# Patient Record
Sex: FEMALE | Race: WHITE | Employment: FULL TIME | URBAN - METROPOLITAN AREA
[De-identification: names, ages, dates, MRNs, and addresses within clinical notes are randomized per-mention and may not be internally consistent; named-entity substitution may affect disease eponyms.]

---

## 2017-12-11 LAB
CHLAMYDIA, EXTERNAL: NEGATIVE
N. GONORRHEA, EXTERNAL: NEGATIVE

## 2018-03-19 LAB
GTT 120 MIN, EXTERNAL: 147
GTT 180 MIN, EXTERNAL: 103
GTT 60 MIN, EXTERNAL: 194
GTT, 1 HR, GLUCOLA, EXTERNAL: 167
GTT, FASTING, EXTERNAL: 78
HBSAG, EXTERNAL: NORMAL
HIV, EXTERNAL: NONREACTIVE
RUBELLA, EXTERNAL: NORMAL
TYPE, ABO & RH, EXTERNAL: NORMAL

## 2018-05-16 LAB
ANTIBODY SCREEN, EXTERNAL: NORMAL
T. PALLIDUM, EXTERNAL: NORMAL

## 2018-07-10 LAB — GRBS, EXTERNAL: NORMAL

## 2018-07-30 ENCOUNTER — HOSPITAL ENCOUNTER (INPATIENT)
Age: 33
LOS: 4 days | Discharge: HOME | End: 2018-08-03
Attending: OBSTETRICS & GYNECOLOGY | Admitting: OBSTETRICS & GYNECOLOGY
Payer: COMMERCIAL

## 2018-07-30 DIAGNOSIS — Z34.90 PREGNANCY, UNSPECIFIED GESTATIONAL AGE: Primary | ICD-10-CM

## 2018-07-30 LAB
A1 MICROGLOB PLACENTAL VAG QL: POSITIVE
BASOPHILS # BLD: 0 K/UL (ref 0–0.1)
BASOPHILS NFR BLD: 0 % (ref 0–1)
CONTROL LINE PRESENT?: NORMAL
DAILY QC (YES/NO)?: YES
DIFFERENTIAL METHOD BLD: ABNORMAL
EOSINOPHIL # BLD: 0 K/UL (ref 0–0.4)
EOSINOPHIL NFR BLD: 0 % (ref 0–7)
ERYTHROCYTE [DISTWIDTH] IN BLOOD BY AUTOMATED COUNT: 14.1 % (ref 11.5–14.5)
EXPIRATION DATE: NORMAL
HCT VFR BLD AUTO: 32.4 % (ref 35–47)
HGB BLD-MCNC: 10.2 G/DL (ref 11.5–16)
IMM GRANULOCYTES # BLD: 0.1 K/UL (ref 0–0.04)
IMM GRANULOCYTES NFR BLD AUTO: 1 % (ref 0–0.5)
INTERNAL NEGATIVE CONTROL: NORMAL
KIT LOT NO.: NORMAL
LYMPHOCYTES # BLD: 2.2 K/UL (ref 0.8–3.5)
LYMPHOCYTES NFR BLD: 21 % (ref 12–49)
MCH RBC QN AUTO: 24.4 PG (ref 26–34)
MCHC RBC AUTO-ENTMCNC: 31.5 G/DL (ref 30–36.5)
MCV RBC AUTO: 77.5 FL (ref 80–99)
MONOCYTES # BLD: 0.7 K/UL (ref 0–1)
MONOCYTES NFR BLD: 7 % (ref 5–13)
NEUTS SEG # BLD: 7.5 K/UL (ref 1.8–8)
NEUTS SEG NFR BLD: 71 % (ref 32–75)
NRBC # BLD: 0 K/UL (ref 0–0.01)
NRBC BLD-RTO: 0 PER 100 WBC
PH, VAGINAL FLUID: 7 (ref 5–6.1)
PLATELET # BLD AUTO: 282 K/UL (ref 150–400)
PMV BLD AUTO: 10.9 FL (ref 8.9–12.9)
RBC # BLD AUTO: 4.18 M/UL (ref 3.8–5.2)
WBC # BLD AUTO: 10.5 K/UL (ref 3.6–11)

## 2018-07-30 PROCEDURE — 83986 ASSAY PH BODY FLUID NOS: CPT | Performed by: OBSTETRICS & GYNECOLOGY

## 2018-07-30 PROCEDURE — 85025 COMPLETE CBC W/AUTO DIFF WBC: CPT

## 2018-07-30 PROCEDURE — 75410000002 HC LABOR FEE PER 1 HR

## 2018-07-30 PROCEDURE — 84112 EVAL AMNIOTIC FLUID PROTEIN: CPT | Performed by: OBSTETRICS & GYNECOLOGY

## 2018-07-30 PROCEDURE — 65410000002 HC RM PRIVATE OB

## 2018-07-30 PROCEDURE — 36415 COLL VENOUS BLD VENIPUNCTURE: CPT

## 2018-07-30 RX ORDER — SODIUM CHLORIDE, SODIUM LACTATE, POTASSIUM CHLORIDE, CALCIUM CHLORIDE 600; 310; 30; 20 MG/100ML; MG/100ML; MG/100ML; MG/100ML
125 INJECTION, SOLUTION INTRAVENOUS CONTINUOUS
Status: DISCONTINUED | OUTPATIENT
Start: 2018-07-30 | End: 2018-08-03 | Stop reason: HOSPADM

## 2018-07-30 RX ORDER — OXYTOCIN IN 5 % DEXTROSE 30/500 ML
.5-2 PLASTIC BAG, INJECTION (ML) INTRAVENOUS
Status: DISCONTINUED | OUTPATIENT
Start: 2018-07-30 | End: 2018-08-03 | Stop reason: HOSPADM

## 2018-07-30 RX ORDER — DOCUSATE SODIUM 100 MG/1
100 CAPSULE, LIQUID FILLED ORAL
COMMUNITY

## 2018-07-30 RX ORDER — NALOXONE HYDROCHLORIDE 0.4 MG/ML
0.4 INJECTION, SOLUTION INTRAMUSCULAR; INTRAVENOUS; SUBCUTANEOUS AS NEEDED
Status: DISCONTINUED | OUTPATIENT
Start: 2018-07-30 | End: 2018-07-31 | Stop reason: HOSPADM

## 2018-07-30 NOTE — PROGRESS NOTES
1900 - pt reports to triage with c/o possible ROM around 1700 - with continued leaking of fluid since. Clear, no odor. Reports positive FM, denies VB. Consents reviewed and signed by Monserrat Weiner. 1930 - amnisure positive. Dr. Ritu Cristobal notified. Pt moved to labor room and admission process begun. 0 - Primary Nurse Colletta Montenegro, RN and Senia Yen RN performed a dual skin assessment on this patient No impairment noted Teodoro score is 22 
 
2019 - bedside ultrasound to confirm vertex. Dr. Ritu Cristobal reviewed POC with patient. 2028 - EFM and toco removed for ambulation. 4683 - Dr Ritu Cristobal asked about medication to help sleep - ordered ambien. 0405 - pt called out because IV pump beeping occlusion. Flushed with some resistance. Will retape. Pt up to BR. Feeling pain in her back 
 
0700 - verbal bedside report to [employer]. Turned over care of pt at this time.

## 2018-07-30 NOTE — IP AVS SNAPSHOT
[address] [facility] 
[phone] Patient: Molina Martinez MRN: [id number] :1985 About your hospitalization You were admitted on:  2018 You last received care in the:  [facility] You were discharged on:  August 3, 2018 Why you were hospitalized Your primary diagnosis was:  Not on File Your diagnoses also included:  Pregnancy Follow-up Information Follow up With Details Comments Contact Info Mandeep Ross MD   [address] [address] 
[phone] Lamonte Carrasco MD In 6 weeks  [address] [facility] 
[phone] Lamonte Carrasco MD In 6 weeks Follow up with [facility] in 6 weeks; call to schedule appointment. [address] [facility] 
[phone] Discharge Orders None A check minerva indicates which time of day the medication should be taken. My Medications START taking these medications Instructions Each Dose to Equal  
 Morning Noon Evening Bedtime  
 ibuprofen 800 mg tablet Commonly known as:  MOTRIN Your last dose was: Your next dose is: Take 1 Tab by mouth every eight (8) hours as needed for Pain. 800 mg  
    
   
   
   
  
 oxyCODONE-acetaminophen 5-325 mg per tablet Commonly known as:  PERCOCET Your last dose was: Your next dose is: Take 1-2 Tabs by mouth every six (6) hours as needed for Pain. Max Daily Amount: 8 Tabs. 1-2 Tab CONTINUE taking these medications Instructions Each Dose to Equal  
 Morning Noon Evening Bedtime COLACE 100 mg capsule Generic drug:  docusate sodium Your last dose was: Your next dose is: Take 100 mg by mouth two (2) times daily as needed for Constipation.   
 100 mg  
    
   
   
   
  
 PNV38-Iron Cbn&Gluc-FA-DSS-DHA 35-1- mg Cmpk Your last dose was: Your next dose is: Take 1 Tab by mouth daily. Indications: pregnancy 1 Tab Where to Get Your Medications Information on where to get these meds will be given to you by the nurse or doctor. ! Ask your nurse or doctor about these medications  
  ibuprofen 800 mg tablet  
 oxyCODONE-acetaminophen 5-325 mg per tablet Opioid Education Prescription Opioids: What You Need to Know: 
 
Prescription opioids can be used to help relieve moderate-to-severe pain and are often prescribed following a surgery or injury, or for certain health conditions. These medications can be an important part of treatment but also come with serious risks. Opioids are strong pain medicines. Examples include hydrocodone, oxycodone, fentanyl, and morphine. Heroin is an example of an illegal opioid. It is important to work with your health care provider to make sure you are getting the safest, most effective care. WHAT ARE THE RISKS AND SIDE EFFECTS OF OPIOID USE? Prescription opioids carry serious risks of addiction and overdose, especially with prolonged use. An opioid overdose, often marked by slow breathing, can cause sudden death. The use of prescription opioids can have a number of side effects as well, even when taken as directed. · Tolerance-meaning you might need to take more of a medication for the same pain relief · Physical dependence-meaning you have symptoms of withdrawal when the medication is stopped. Withdrawal symptoms can include nausea, sweating, chills, diarrhea, stomach cramps, and muscle aches. Withdrawal can last up to several weeks, depending on which drug you took and how long you took it. · Increased sensitivity to pain · Constipation · Nausea, vomiting, and dry mouth · Sleepiness and dizziness · Confusion · Depression · Low levels of testosterone that can result in lower sex drive, energy, and strength · Itching and sweating RISKS ARE GREATER WITH:      
· History of drug misuse, substance use disorder, or overdose · Mental health conditions (such as depression or anxiety) · Sleep apnea · Older age (72 years or older) · Pregnancy Avoid alcohol while taking prescription opioids. Also, unless specifically advised by your health care provider, medications to avoid include: · Benzodiazepines (such as Xanax or Valium) · Muscle relaxants (such as Soma or Flexeril) · Hypnotics (such as Ambien or Lunesta) · Other prescription opioids KNOW YOUR OPTIONS Talk to your health care provider about ways to manage your pain that don't involve prescription opioids. Some of these options may actually work better and have fewer risks and side effects. Options may include: 
· Pain relievers such as acetaminophen, ibuprofen, and naproxen · Some medications that are also used for depression or seizures · Physical therapy and exercise · Counseling to help patients learn how to cope better with triggers of pain and stress. · Application of heat or cold compress · Massage therapy · Relaxation techniques Be Informed Make sure you know the name of your medication, how much and how often to take it, and its potential risks & side effects. IF YOU ARE PRESCRIBED OPIOIDS FOR PAIN: 
· Never take opioids in greater amounts or more often than prescribed. Remember the goal is not to be pain-free but to manage your pain at a tolerable level. · Follow up with your primary care provider to: · Work together to create a plan on how to manage your pain. · Talk about ways to help manage your pain that don't involve prescription opioids. · Talk about any and all concerns and side effects. · Help prevent misuse and abuse. · Never sell or share prescription opioids · Help prevent misuse and abuse. · Store prescription opioids in a secure place and out of reach of others (this may include visitors, children, friends, and family). · Safely dispose of unused/unwanted prescription opioids: Find your community drug take-back program or your pharmacy mail-back program, or flush them down the toilet, following guidance from the Food and Drug Administration (www.fda.gov/Drugs/ResourcesForYou). · Visit www.cdc.gov/drugoverdose to learn about the risks of opioid abuse and overdose. · If you believe you may be struggling with addiction, tell your health care provider and ask for guidance or call [employer] at 1-637-400-FDGC. Discharge Instructions  Delivery (Postpartum Care): After Your Visit Your Care Instructions Your baby was delivered through a cut, called an incision, in your belly. This surgery is called a  delivery, or a . After childbirth (postpartum period), your body goes through many changes. In the next few weeks, your body will slowly heal. It can take 4 weeks or more for the incision from your surgery to heal. It is easy to get too tired and overwhelmed during the first weeks after your baby is born. You may feel emotional during this time. Changes in your hormones can shift your mood without warning. It is easy to get too tired and overwhelmed during the first weeks after childbirth. Take it easy on yourself. You may find it hard to meet the extra demands on your energy and time. Get rest whenever you can, accept help from others, and eat well and drink plenty of fluids. After a , you may have gas or need to burp a lot. You may have some light vaginal bleeding or spotting for up to 6 weeks after surgery. It is normal to have pain in the incision area off and on during the next 6 months. No driving for 1 week after delivery. No heavy lifting. No more than 8 lbs or the size of baby for 2-3 weeks. Limit use of stairs. 1-2 times per day for the first week after delivery. Follow-up care is a key part of your treatment and safety. Be sure to make and go to all appointments, and call your doctor if you are having problems. Its also a good idea to know your test results and keep a list of the medicines you take. Around 4 to 6 weeks after your baby's birth, you will have a follow-up visit with your doctor. This visit is your time to talk to your doctor about anything you are concerned or curious about. Keep a list of questions to bring to your postpartum visit. Your questions might be about: 
Changes in your breasts, such as lumps or soreness. When to expect your menstrual period to start again. What form of birth control is best for you. Weight you have put on during the pregnancy. Exercise options. What foods and drinks are best for you, especially if you are breast-feeding. Problems you might be having with breast-feeding. When you can have sex. Some women may want to talk about lubricants for the vagina. Any feelings of sadness or restlessness that you are having. How can you care for yourself at home? Be sure that you understand any instructions your doctor has given you after surgery. This will guide your activities and tell you what to watch for in the next few weeks. Vaginal bleeding and cramps After delivery, you will have a bloody discharge from the vagina. This will turn pink within a week and then white or yellow after about 10 days. It may last for 2 to 4 weeks or longer, until the uterus has healed. You may have cramps for the first few days after childbirth. These are normal and occur as the uterus shrinks to normal size. Take an over-the-counter pain medicine, such as acetaminophen (Tylenol), ibuprofen (Advil, Motrin), or naproxen (Aleve), for cramps.  Read and follow all instructions on the label. Do not take aspirin, because it can cause more bleeding. Take other medicines exactly as prescribed. Call your doctor if you have any problems with your medicine. Incision You may have had staples removed while you were in the hospital. Keep the area clean, and wash it with water and mild soap. If you had stitches, they should dissolve on their own and should not need to be removed. Follow your doctor's instructions for cleaning the stitched area. If you have steri-strips (tape) the will come off on their own in 7-10 days. Breast-feeding and breast fullness Breast-feed your baby in positions that do not put pressure on your incision, such as side-lying or football-hold positions. Ask your nurse, doctor, midwife, or lactation specialist to show you these positions if you do not know what they are. Your breasts may overfill (engorge) in the first few days after delivery. To help milk flow and to relieve pain, warm your breasts in the shower or by using warm, moist towels before nursing. Express a small amount of milk to soften your breast near the nipple and then feed your baby. Put an ice or cold pack on your breast for a few minutes after nursing to reduce swelling and pain. Put a thin cloth between the ice pack and your skin. If you are not nursing, do not put warmth on your breasts or touch your breasts. Wear a tight bra or sports bra, and use an ice or cold pack on your breasts to reduce swelling and pain. Breast fullness usually lasts 3 to 4 days. Activity Eat a balanced diet. Do not try to lose weight by cutting calories. Keep taking your prenatal vitamins, or take a multivitamin. Get as much rest as you can. Try to take naps when your baby sleeps during the day. Get help with household chores from family or friends, if you can. Do not try to do it all yourself. Get some gentle exercise, such as walking, every day.  Do not lift more than 10 pounds for the next 4 to 6 weeks. Do not have sex or use tampons until you have stopped bleeding and at least 4 to 6 weeks have passed since you gave birth. Talk to your doctor about birth control. You can get pregnant even before your period returns. Also, you can get pregnant while you are breast-feeding. Mental health It is normal to have some sadness, anxiety, sleeplessness, and mood swings after you go home. If you feel upset or hopeless for more than a few days, talk to your doctor. If you have any thoughts of hurting yourself or anyone elseincluding your babycall 911 or go to the emergency room. Many women get the \"baby blues\" during the first few days after childbirth. The \"baby blues\" usually peak around the fourth day and then ease up in less than 2 weeks. If you have the \"baby blues\" for more than a few days, or if you have thoughts of hurting yourself or your baby, call your doctor right away. Constipation and hemorrhoids Drink plenty of fluids (8 to 10 glasses a day), especially water. · Eat plenty of fiber each day to avoid constipation. Include foods such as whole-grain breads and cereals, raw vegetables, raw and dried fruits, and beans. · If you have hemorrhoids or swelling or pain around the opening of your vagina, try using cold and heat. You can put ice or a cold pack on the area for 10 to 20 minutes at a time. Put a thin cloth between the ice and your skin. Also try sitting in a few inches of warm water (sitz bath) 3 times a day and after bowel movements. Drink plenty of fluids, enough so that your urine is light yellow or clear like water. If you have kidney, heart, or liver disease and have to limit fluids, talk with your doctor before you increase the amount of fluids you drink. When should you call for help? Call 911 anytime you think you may need emergency care. For example, call if: 
You are thinking of hurting yourself, your baby, or anyone else. You have sudden, severe pain in your belly. You pass out (lose consciousness). Call your doctor now or seek immediate medical care if: 
You have severe vaginal bleeding. You are passing blood clots and soaking through a pad each hour for 2 or more hours. Your vaginal bleeding seems to be getting heavier or is still bright red 4 days after delivery, or you pass blood clots larger than the size of a golf ball. You have increased redness, heat, or drainage in the incision area. You are dizzy or lightheaded, or you feel like you may faint. You are vomiting or cannot keep fluids down. You have a fever. You have new or more belly pain. You pass tissue (not just blood). The incision seems to be pulling open or starts bleeding. Your vaginal discharge smells bad. Your belly feels more tender or full and hard. You have pain or redness in one or both breasts. You feel sad, anxious, or hopeless for more than a few days. Where can you learn more? Go to English TV.be Enter W957 in the search box to learn more about \" Delivery: After Your Visit\". or 
  
Go to English [employer].be Enter J731  in the search box to learn more about \"Postpartum Care: After Your Visit\". This care instruction is for use with your licensed healthcare professional. If you have questions about a medical condition or this instruction, always ask your healthcare professional. Care instructions adapted by [employer] (which disclaims liability or warranty for this information) from Torie Montoya, [address] [facility] 2008. [facility] disclaims any warranty or liability for your use of this information. Follow up with [facility] in 6 weeks; call to schedule appointment. [employer] Announcement We are excited to announce that we are making your provider's discharge notes available to you in [employer].   You will see these notes when they are completed and signed by the physician that discharged you from your recent hospital stay. If you have any questions or concerns about any information you see in Yi De, please call the Health Information Department where you were seen or reach out to your Primary Care Provider for more information about your plan of care. Introducing [facility]! Naresh Shelton introduces Yi De patient portal. Now you can access parts of your medical record, email your doctor's office, and request medication refills online. 1. In your internet browser, go to [email or web address] [email or web address] [email or web address] Team Connect 2. Click on the First Time User? Click Here link in the Sign In box. You will see the New Member Sign Up page. 3. Enter your Yi De Access Code exactly as it appears below. You will not need to use this code after youve completed the sign-up process. If you do not sign up before the expiration date, you must request a new code. · Yi De Access Code: [email or web address] Expires: 11/1/2018 12:49 PM 
 
4. Enter the last four digits of your Social Security Number (xxxx) and Date of Birth (mm/dd/yyyy) as indicated and click Submit. You will be taken to the next sign-up page. 5. Create a Yi De ID. This will be your Yi De login ID and cannot be changed, so think of one that is secure and easy to remember. 6. Create a Yi De password. You can change your password at any time. 7. Enter your Password Reset Question and Answer. This can be used at a later time if you forget your password. 8. Enter your e-mail address. You will receive e-mail notification when new information is available in [address] 9. Click Sign Up. You can now view and download portions of your medical record. 10. Click the Download Summary menu link to download a portable copy of your medical information.  
 
If you have questions, please visit the Frequently Asked Questions section of the [employer]. Remember, MyChart is NOT to be used for urgent needs. For medical emergencies, dial 911. Now available from your iPhone and Android! Introducing Cristi Turpin As a Sarina Bergman patient, I wanted to make you aware of our electronic visit tool called Cristi Leosfin. [employer] 24/7 allows you to connect within minutes with a medical provider 24 hours a day, seven days a week via a mobile device or tablet or logging into a secure website from your computer. You can access Cristi Turpin from anywhere in the [place]. A virtual visit might be right for you when you have a simple condition and feel like you just dont want to get out of bed, or cant get away from work for an appointment, when your regular [facility] provider is not available (evenings, weekends or holidays), or when youre out of town and need minor care. Electronic visits cost only $49 and if the [facility] 24/7 provider determines a prescription is needed to treat your condition, one can be electronically transmitted to a nearby pharmacy*. Please take a moment to enroll today if you have not already done so. The enrollment process is free and takes just a few minutes. To enroll, please download the [employer] 24/7 eben to your tablet or phone, or visit [email or web address] to enroll on your computer. And, as an [address] [place] [address] patient with a [employer] account, the results of your visits will be scanned into your electronic medical record and your primary care provider will be able to view the scanned results. We urge you to continue to see your regular [facility] provider for your ongoing medical care. And while your primary care provider may not be the one available when you seek a Cristi Turpin virtual visit, the peace of mind you get from getting a real diagnosis real time can be priceless. For more information on Cristi Toonely, view our Frequently Asked Questions (FAQs) at [email or web address]. Sincerely, 
 
Madhuri Dennison MD 
Chief Medical Officer [employer] *:  certain medications cannot be prescribed via Cristi Turpin Providers Seen During Your Hospitalization Provider Specialty Primary office phone Geronimo Rodriguez MD Obstetrics & Gynecology [phone] Mariposa Wilkinson MD Obstetrics & Gynecology [phone] Your Primary Care Physician (PCP) Primary Care Physician Office Phone Office Fax Marshell Counts [phone] You are allergic to the following No active allergies Recent Documentation Height Weight Breastfeeding? BMI OB Status Smoking Status 1.575 m 66.7 kg Yes 26.89 kg/m2 Recent pregnancy Never Smoker Emergency Contacts Name Discharge Info Relation Home Work Mobile Demetrius Oliveros DISCHARGE CAREGIVER [3] Spouse [3]   [phone] Ceci Lawler  Mother [14] [phone] Patient Belongings The following personal items are in your possession at time of discharge: 
  Dental Appliances: None  Visual Aid: None      Home Medications: None   Jewelry: None  Clothing: Undergarments, Shirt, Footwear, Pants    Other Valuables: Personal toiletries, Cell Phone, Darylene Ponrylanf  Personal Items Sent to Safe: none Please provide this summary of care documentation to your next provider. Signatures-by signing, you are acknowledging that this After Visit Summary has been reviewed with you and you have received a copy. Patient Signature:  ____________________________________________________________ Date:  ____________________________________________________________  
  
[facility] Signature[facility]  ____________________________________________________________ Date:  ____________________________________________________________

## 2018-07-30 NOTE — H&P
EDC:2018 EGA: 39 weeks, 3 days HPI: Pt is a 29yo  @ 39w3d who presents with PROM at 1700. Patient denies any issues with this pregnancy. She denies any vaginal bleeding and denies any contractions. Patient's Prenatal Care with Doctor of Record Estella Nuñez MD Notable For - Abnormal glucola 3 hr GTT normal 
Marginal placenta previa w/o hemorrhage, resolved at 28 weeks. Normal pregnancy primigravida  lab screening Impression & Recommendations: 
 
Problem # 1:  PROM 
-admit to L&D 
-CBC 
-ambulate as tolerated 
-pain meds/epidural as desired 
-if no labor in 4-6 hours, will start pitocin 
-GBS negative 
-all questions answered Past Medical History: 
   Reviewed history from 2017 and no changes required: Abnormal pap -ASCUS (+) HPV,  colposcopy -no bx's repaps --normal 
 
Past Surgical History: 
   Reviewed history from 2007 and no changes required: Tonsillectomy [place] Teeth Family History Summary:  
   Reviewed history Last on 2018 and no changes required:2018 No Known Family History - Entered On: 2017 General Comments - FH: 
Family history transferred to [facility] CHILDREN - LEXIE compliant Social History: 
   Reviewed history from 2017 and no changes required: 
       [employer] - 4th grade Smoking History: 
      Patient has never smoked. Review of Systems Except as noted in the HPI, the review of systems is negative for General, Breast, , CV, Resp, Endo, MS, Derm, Neuro, Psych, Allergy and Heme. Allergies Medications Removed from Medication List 
 
 
 
[facility] for Follow-up Visit Estimated weeks of 
      gestation:  39 3/7 Weight:  147.5 Blood pressure: 120 / 77 Vital Signs Blood Pressure: 120 / 77 Temperature:  98.7 deg. F. 
Pulse rate: 92 / minute Resp rate: 18 / minute Height:   62.25 inches Weight:  147.5 pounds BMI:   26.86 inches Physical Exam  
 
General  
        General appearance:  no acute distress Head Inspection:   normal 
 
Eyes External:   EOM intact ENT Dental:   adequate dentition Chest  
        Lungs:  clear to auscultation; normal respiratory effort Heart:  regular rate and rhythm Extremeties Extremeties:  0 edema Psych Orientation:  oriented to time, place, and person Mood:  no appearance of anxiety, depression, or agitation Skin Inspection:  no rashes, suspicious lesions, or ulcerations Abdomen Abdomen:  soft, gravid, nontender Pelvic Exam  
        EGBUS:  no lesions Vagina:  normal appearing without lesions or discharge Uterus:  gravid Cervix:  no lesions or discharge FHT:  140's moderate variability, +accels, no decels Quay:  none SVE:  1/70/-1 Bedside ultrasound confirms vtx Impression & Recommendations: 
 
Problem # 1:  PROM 
-admit to L&D 
-CBC 
-ambulate as tolerated 
-pain meds/epidural as desired 
-if no labor in 4-6 hours, will start pitocin 
-GBS negative 
-reactive FHT 
-all questions answered Medications (at conclusion of this visit) 05/29/2018 COLACE CAPSULE (DOCUSATE SODIUM CAPS) Prescribed by [facility]NA MD 
12/20/2017 PRENATAL FORMULA TABLET (PRENATAL VIT-FE FUMARATE-FA TABS) LABORATORY DATA TEST DATE RESULT Group B Strep culture 07/10/2018 Negative                                   (Group B Strep Culture Result Field) Blood Type 03/19/2018 O                                             (Blood Type Result Field) Rh 03/19/2018 Positive                                   (Rh Result Field) Rhogam Inj Given Tdap Vaccine Given 06/12/2018 Vacc. [facility] [facility] Antibody Screen 05/16/2018 Negative Rubella [place] Reference Ranges On or After 3/10/14             
    <0.90 Non-immune 0.90 - 0.99     Equivocal 
    >0.99              Immune [address] Before 3/10/14    
      <5                 Non-immune 5 - 9               Equivocal     
      >9                 Immune [facility] < Or = 0.90       Negative        
    0.91-1.09          Equivocal       
    > Or = 1.10       Positive   03/19/2018 
 
 2.16 
  
TPA (T Pallidum Antibodies) 05/16/2018 Negative Serology (RPR) HBsAg 03/19/2018 Negative HIV 03/19/2018 Non Reactive Hemoglobin 05/16/2018 11.1 Hematocrit 05/16/2018 34.4 Platelets 46/71/3580 282 X10E3/UL  
TSH Urine Culture 12/11/2017 Negative GC DNA Probe 12/11/2017 Negative Chlamydia DNA 12/11/2017 Negative PAP 05/18/2015 NIL Flu Vaccine Given 12/20/2017 Vacc. [facility]  
HGBA1C    
HGB Electro T4, Free BG Fasting 05/29/2018 78 GTT 1H 50G 05/16/2018 167 GTT 1H 100G 05/29/2018 194 GTT 2H 100G 05/29/2018 147 GTT 3H 100G 05/29/2018 103 Glucose Plasma CF Accept or Decline 02/12/2018 Declined CF Screen Result Nuchal Trans 01/26/2018 2.1^2.1 mm&millimeters AFP Only 02/12/2018 Declined Tetra 02/12/2018 Not Indicated AFP Serum 12/20/2017 declined CVS 12/20/2017 declined AFP Amniotic Amnio Karyo 12/20/2017 declined FISH    
GC Culture Chlamydia Cult Ureaplasma Mycoplasma WBC 03/19/2018 10.3 X10E3/UL  
RBC 03/19/2018 4.32 X10E6/UL  
MCV 03/19/2018 85 MCH 03/19/2018 27.8 MCHC RBC 03/19/2018 32.8 ULTRASOUND DATA TEST DATE RESULT Estimated Fetal Weight 05/16/2018 [id number]^1475 g&grams Weight % 05/16/2018 79^79% %&percent HARSHAL 05/16/2018 17.93^17.9 cm&centimeters BPP 05/16/2018 8^8 [n/a]&Not applicable Cervical Length (mm) Electronically signed by Citlali Yi DO on 07/30/2018 at 7:46 PM 
 
________________________________________________________________________

## 2018-07-31 ENCOUNTER — ANESTHESIA (OUTPATIENT)
Dept: LABOR AND DELIVERY | Age: 33
End: 2018-07-31
Payer: COMMERCIAL

## 2018-07-31 ENCOUNTER — ANESTHESIA EVENT (OUTPATIENT)
Dept: LABOR AND DELIVERY | Age: 33
End: 2018-07-31
Payer: COMMERCIAL

## 2018-07-31 PROCEDURE — 65410000002 HC RM PRIVATE OB

## 2018-07-31 PROCEDURE — 75410000003 HC RECOV DEL/VAG/CSECN EA 0.5 HR

## 2018-07-31 PROCEDURE — 74011250636 HC RX REV CODE- 250/636: Performed by: OBSTETRICS & GYNECOLOGY

## 2018-07-31 PROCEDURE — 74011250636 HC RX REV CODE- 250/636: Performed by: ANESTHESIOLOGY

## 2018-07-31 PROCEDURE — 74011000250 HC RX REV CODE- 250

## 2018-07-31 PROCEDURE — 77030039266 HC ADH SKN EXOFIN S2SG -A

## 2018-07-31 PROCEDURE — 77030002933 HC SUT MCRYL J&J -A

## 2018-07-31 PROCEDURE — 77030031139 HC SUT VCRL2 J&J -A

## 2018-07-31 PROCEDURE — 76060000078 HC EPIDURAL ANESTHESIA: Performed by: OBSTETRICS & GYNECOLOGY

## 2018-07-31 PROCEDURE — 76060000078 HC EPIDURAL ANESTHESIA

## 2018-07-31 PROCEDURE — 74011250637 HC RX REV CODE- 250/637: Performed by: OBSTETRICS & GYNECOLOGY

## 2018-07-31 PROCEDURE — 74011250636 HC RX REV CODE- 250/636

## 2018-07-31 PROCEDURE — 77010026065 HC OXYGEN MINIMUM MEDICAL AIR

## 2018-07-31 PROCEDURE — 99285 EMERGENCY DEPT VISIT HI MDM: CPT

## 2018-07-31 PROCEDURE — 77030014125 HC TY EPDRL BBMI -B: Performed by: ANESTHESIOLOGY

## 2018-07-31 PROCEDURE — 75410000002 HC LABOR FEE PER 1 HR

## 2018-07-31 PROCEDURE — 76060000032 HC ANESTHESIA 0.5 TO 1 HR: Performed by: OBSTETRICS & GYNECOLOGY

## 2018-07-31 PROCEDURE — 77030011640 HC PAD GRND REM COVD -A

## 2018-07-31 PROCEDURE — 76010000391 HC C SECN FIRST 1 HR: Performed by: OBSTETRICS & GYNECOLOGY

## 2018-07-31 RX ORDER — ONDANSETRON 2 MG/ML
INJECTION INTRAMUSCULAR; INTRAVENOUS AS NEEDED
Status: DISCONTINUED | OUTPATIENT
Start: 2018-07-31 | End: 2018-07-31 | Stop reason: HOSPADM

## 2018-07-31 RX ORDER — NALBUPHINE HYDROCHLORIDE 10 MG/ML
10 INJECTION, SOLUTION INTRAMUSCULAR; INTRAVENOUS; SUBCUTANEOUS
Status: DISCONTINUED | OUTPATIENT
Start: 2018-07-31 | End: 2018-08-03 | Stop reason: HOSPADM

## 2018-07-31 RX ORDER — CEFAZOLIN SODIUM/WATER 2 G/20 ML
SYRINGE (ML) INTRAVENOUS
Status: COMPLETED
Start: 2018-07-31 | End: 2018-07-31

## 2018-07-31 RX ORDER — ACETAMINOPHEN 10 MG/ML
INJECTION, SOLUTION INTRAVENOUS AS NEEDED
Status: DISCONTINUED | OUTPATIENT
Start: 2018-07-31 | End: 2018-07-31 | Stop reason: HOSPADM

## 2018-07-31 RX ORDER — LIDOCAINE HYDROCHLORIDE AND EPINEPHRINE 20; 5 MG/ML; UG/ML
INJECTION, SOLUTION EPIDURAL; INFILTRATION; INTRACAUDAL; PERINEURAL AS NEEDED
Status: DISCONTINUED | OUTPATIENT
Start: 2018-07-31 | End: 2018-07-31 | Stop reason: HOSPADM

## 2018-07-31 RX ORDER — ZOLPIDEM TARTRATE 5 MG/1
5 TABLET ORAL
Status: DISCONTINUED | OUTPATIENT
Start: 2018-07-31 | End: 2018-08-03 | Stop reason: HOSPADM

## 2018-07-31 RX ORDER — NALOXONE HYDROCHLORIDE 0.4 MG/ML
0.4 INJECTION, SOLUTION INTRAMUSCULAR; INTRAVENOUS; SUBCUTANEOUS AS NEEDED
Status: DISCONTINUED | OUTPATIENT
Start: 2018-07-31 | End: 2018-08-01 | Stop reason: SDUPTHER

## 2018-07-31 RX ORDER — FENTANYL/BUPIVACAINE/NS/PF 2-1250MCG
1-16 PREFILLED PUMP RESERVOIR EPIDURAL CONTINUOUS
Status: DISCONTINUED | OUTPATIENT
Start: 2018-07-31 | End: 2018-07-31 | Stop reason: HOSPADM

## 2018-07-31 RX ORDER — SODIUM CHLORIDE, SODIUM LACTATE, POTASSIUM CHLORIDE, CALCIUM CHLORIDE 600; 310; 30; 20 MG/100ML; MG/100ML; MG/100ML; MG/100ML
125 INJECTION, SOLUTION INTRAVENOUS CONTINUOUS
Status: DISCONTINUED | OUTPATIENT
Start: 2018-07-31 | End: 2018-08-01 | Stop reason: SDUPTHER

## 2018-07-31 RX ORDER — KETOROLAC TROMETHAMINE 30 MG/ML
30 INJECTION, SOLUTION INTRAMUSCULAR; INTRAVENOUS
Status: DISCONTINUED | OUTPATIENT
Start: 2018-07-31 | End: 2018-08-01 | Stop reason: SDUPTHER

## 2018-07-31 RX ORDER — MORPHINE SULFATE 0.5 MG/ML
INJECTION, SOLUTION EPIDURAL; INTRATHECAL; INTRAVENOUS AS NEEDED
Status: DISCONTINUED | OUTPATIENT
Start: 2018-07-31 | End: 2018-07-31 | Stop reason: HOSPADM

## 2018-07-31 RX ORDER — OXYTOCIN/RINGER'S LACTATE 20/1000 ML
125-500 PLASTIC BAG, INJECTION (ML) INTRAVENOUS ONCE
Status: ACTIVE | OUTPATIENT
Start: 2018-07-31 | End: 2018-08-01

## 2018-07-31 RX ORDER — LIDOCAINE HYDROCHLORIDE AND EPINEPHRINE 20; 5 MG/ML; UG/ML
INJECTION, SOLUTION EPIDURAL; INFILTRATION; INTRACAUDAL; PERINEURAL
Status: COMPLETED
Start: 2018-07-31 | End: 2018-07-31

## 2018-07-31 RX ORDER — DIPHENHYDRAMINE HCL 25 MG
25 CAPSULE ORAL
Status: DISCONTINUED | OUTPATIENT
Start: 2018-07-31 | End: 2018-08-03 | Stop reason: HOSPADM

## 2018-07-31 RX ORDER — FENTANYL/BUPIVACAINE/NS/PF 2-1250MCG
PREFILLED PUMP RESERVOIR EPIDURAL
Status: DISPENSED
Start: 2018-07-31 | End: 2018-07-31

## 2018-07-31 RX ORDER — METHYLERGONOVINE MALEATE 0.2 MG/ML
INJECTION INTRAVENOUS
Status: DISPENSED
Start: 2018-07-31 | End: 2018-08-01

## 2018-07-31 RX ORDER — DIPHENHYDRAMINE HYDROCHLORIDE 50 MG/ML
25-50 INJECTION, SOLUTION INTRAMUSCULAR; INTRAVENOUS
Status: DISCONTINUED | OUTPATIENT
Start: 2018-07-31 | End: 2018-08-03 | Stop reason: HOSPADM

## 2018-07-31 RX ORDER — MORPHINE SULFATE 10 MG/ML
4 INJECTION, SOLUTION INTRAMUSCULAR; INTRAVENOUS
Status: DISCONTINUED | OUTPATIENT
Start: 2018-07-31 | End: 2018-08-03 | Stop reason: HOSPADM

## 2018-07-31 RX ORDER — SODIUM CHLORIDE 0.9 % (FLUSH) 0.9 %
5-10 SYRINGE (ML) INJECTION EVERY 8 HOURS
Status: DISCONTINUED | OUTPATIENT
Start: 2018-07-31 | End: 2018-08-03 | Stop reason: HOSPADM

## 2018-07-31 RX ORDER — IBUPROFEN 600 MG/1
600 TABLET ORAL EVERY 8 HOURS
Status: DISCONTINUED | OUTPATIENT
Start: 2018-07-31 | End: 2018-08-03 | Stop reason: HOSPADM

## 2018-07-31 RX ORDER — NALOXONE HYDROCHLORIDE 0.4 MG/ML
0.4 INJECTION, SOLUTION INTRAMUSCULAR; INTRAVENOUS; SUBCUTANEOUS AS NEEDED
Status: DISCONTINUED | OUTPATIENT
Start: 2018-07-31 | End: 2018-08-03 | Stop reason: HOSPADM

## 2018-07-31 RX ORDER — SODIUM CHLORIDE 0.9 % (FLUSH) 0.9 %
5-10 SYRINGE (ML) INJECTION AS NEEDED
Status: DISCONTINUED | OUTPATIENT
Start: 2018-07-31 | End: 2018-08-01 | Stop reason: SDUPTHER

## 2018-07-31 RX ORDER — SIMETHICONE 80 MG
80 TABLET,CHEWABLE ORAL AS NEEDED
Status: DISCONTINUED | OUTPATIENT
Start: 2018-07-31 | End: 2018-08-03 | Stop reason: HOSPADM

## 2018-07-31 RX ORDER — BUPIVACAINE HYDROCHLORIDE AND EPINEPHRINE 2.5; 5 MG/ML; UG/ML
INJECTION, SOLUTION EPIDURAL; INFILTRATION; INTRACAUDAL; PERINEURAL
Status: COMPLETED
Start: 2018-07-31 | End: 2018-07-31

## 2018-07-31 RX ORDER — DOCUSATE SODIUM 100 MG/1
100 CAPSULE, LIQUID FILLED ORAL 2 TIMES DAILY
Status: DISCONTINUED | OUTPATIENT
Start: 2018-07-31 | End: 2018-08-03 | Stop reason: HOSPADM

## 2018-07-31 RX ORDER — SODIUM CHLORIDE, SODIUM LACTATE, POTASSIUM CHLORIDE, CALCIUM CHLORIDE 600; 310; 30; 20 MG/100ML; MG/100ML; MG/100ML; MG/100ML
INJECTION, SOLUTION INTRAVENOUS
Status: DISCONTINUED | OUTPATIENT
Start: 2018-07-31 | End: 2018-07-31 | Stop reason: HOSPADM

## 2018-07-31 RX ORDER — OXYCODONE AND ACETAMINOPHEN 5; 325 MG/1; MG/1
1 TABLET ORAL
Status: DISCONTINUED | OUTPATIENT
Start: 2018-07-31 | End: 2018-08-03 | Stop reason: HOSPADM

## 2018-07-31 RX ORDER — PHENYLEPHRINE HYDROCHLORIDE 10 MG/ML
INJECTION INTRAVENOUS AS NEEDED
Status: DISCONTINUED | OUTPATIENT
Start: 2018-07-31 | End: 2018-07-31 | Stop reason: HOSPADM

## 2018-07-31 RX ORDER — BUPIVACAINE HYDROCHLORIDE AND EPINEPHRINE 2.5; 5 MG/ML; UG/ML
INJECTION, SOLUTION EPIDURAL; INFILTRATION; INTRACAUDAL; PERINEURAL AS NEEDED
Status: DISCONTINUED | OUTPATIENT
Start: 2018-07-31 | End: 2018-07-31 | Stop reason: HOSPADM

## 2018-07-31 RX ORDER — KETOROLAC TROMETHAMINE 30 MG/ML
30 INJECTION, SOLUTION INTRAMUSCULAR; INTRAVENOUS
Status: DISCONTINUED | OUTPATIENT
Start: 2018-07-31 | End: 2018-08-03 | Stop reason: HOSPADM

## 2018-07-31 RX ORDER — ONDANSETRON 2 MG/ML
4 INJECTION INTRAMUSCULAR; INTRAVENOUS
Status: DISCONTINUED | OUTPATIENT
Start: 2018-07-31 | End: 2018-08-01 | Stop reason: SDUPTHER

## 2018-07-31 RX ORDER — HYDROMORPHONE HYDROCHLORIDE 2 MG/ML
INJECTION, SOLUTION INTRAMUSCULAR; INTRAVENOUS; SUBCUTANEOUS AS NEEDED
Status: DISCONTINUED | OUTPATIENT
Start: 2018-07-31 | End: 2018-07-31

## 2018-07-31 RX ORDER — SODIUM CHLORIDE 0.9 % (FLUSH) 0.9 %
5-10 SYRINGE (ML) INJECTION AS NEEDED
Status: DISCONTINUED | OUTPATIENT
Start: 2018-07-31 | End: 2018-08-03 | Stop reason: HOSPADM

## 2018-07-31 RX ORDER — ONDANSETRON 2 MG/ML
4 INJECTION INTRAMUSCULAR; INTRAVENOUS
Status: DISCONTINUED | OUTPATIENT
Start: 2018-07-31 | End: 2018-08-03 | Stop reason: HOSPADM

## 2018-07-31 RX ORDER — OXYTOCIN/RINGER'S LACTATE 20/1000 ML
PLASTIC BAG, INJECTION (ML) INTRAVENOUS
Status: DISCONTINUED | OUTPATIENT
Start: 2018-07-31 | End: 2018-07-31 | Stop reason: HOSPADM

## 2018-07-31 RX ADMIN — PHENYLEPHRINE HYDROCHLORIDE 80 MCG: 10 INJECTION INTRAVENOUS at 18:35

## 2018-07-31 RX ADMIN — BUPIVACAINE HYDROCHLORIDE AND EPINEPHRINE 0.4 ML: 2.5; 5 INJECTION, SOLUTION EPIDURAL; INFILTRATION; INTRACAUDAL; PERINEURAL at 07:11

## 2018-07-31 RX ADMIN — ONDANSETRON 4 MG: 2 INJECTION INTRAMUSCULAR; INTRAVENOUS at 04:46

## 2018-07-31 RX ADMIN — Medication 2 G: at 17:58

## 2018-07-31 RX ADMIN — SODIUM CHLORIDE, SODIUM LACTATE, POTASSIUM CHLORIDE, AND CALCIUM CHLORIDE 125 ML/HR: 600; 310; 30; 20 INJECTION, SOLUTION INTRAVENOUS at 10:46

## 2018-07-31 RX ADMIN — SODIUM CHLORIDE, SODIUM LACTATE, POTASSIUM CHLORIDE, CALCIUM CHLORIDE: 600; 310; 30; 20 INJECTION, SOLUTION INTRAVENOUS at 18:11

## 2018-07-31 RX ADMIN — PHENYLEPHRINE HYDROCHLORIDE 80 MCG: 10 INJECTION INTRAVENOUS at 18:45

## 2018-07-31 RX ADMIN — LIDOCAINE HYDROCHLORIDE AND EPINEPHRINE 10 ML: 20; 5 INJECTION, SOLUTION EPIDURAL; INFILTRATION; INTRACAUDAL; PERINEURAL at 18:00

## 2018-07-31 RX ADMIN — PHENYLEPHRINE HYDROCHLORIDE 80 MCG: 10 INJECTION INTRAVENOUS at 18:58

## 2018-07-31 RX ADMIN — SODIUM CHLORIDE, SODIUM LACTATE, POTASSIUM CHLORIDE, AND CALCIUM CHLORIDE 999 ML/HR: 600; 310; 30; 20 INJECTION, SOLUTION INTRAVENOUS at 17:48

## 2018-07-31 RX ADMIN — NALBUPHINE HYDROCHLORIDE 10 MG: 10 INJECTION, SOLUTION INTRAMUSCULAR; INTRAVENOUS; SUBCUTANEOUS at 04:46

## 2018-07-31 RX ADMIN — PHENYLEPHRINE HYDROCHLORIDE 80 MCG: 10 INJECTION INTRAVENOUS at 18:51

## 2018-07-31 RX ADMIN — SODIUM CHLORIDE, SODIUM LACTATE, POTASSIUM CHLORIDE, AND CALCIUM CHLORIDE 999 ML/HR: 600; 310; 30; 20 INJECTION, SOLUTION INTRAVENOUS at 06:50

## 2018-07-31 RX ADMIN — PHENYLEPHRINE HYDROCHLORIDE 80 MCG: 10 INJECTION INTRAVENOUS at 18:32

## 2018-07-31 RX ADMIN — SODIUM CHLORIDE, SODIUM LACTATE, POTASSIUM CHLORIDE, AND CALCIUM CHLORIDE 125 ML/HR: 600; 310; 30; 20 INJECTION, SOLUTION INTRAVENOUS at 00:13

## 2018-07-31 RX ADMIN — Medication 12 ML/HR: at 14:57

## 2018-07-31 RX ADMIN — LIDOCAINE HYDROCHLORIDE AND EPINEPHRINE 5 ML: 20; 5 INJECTION, SOLUTION EPIDURAL; INFILTRATION; INTRACAUDAL; PERINEURAL at 18:08

## 2018-07-31 RX ADMIN — MORPHINE SULFATE 2 MG: 0.5 INJECTION, SOLUTION EPIDURAL; INTRATHECAL; INTRAVENOUS at 18:20

## 2018-07-31 RX ADMIN — ONDANSETRON 4 MG: 2 INJECTION INTRAMUSCULAR; INTRAVENOUS at 18:16

## 2018-07-31 RX ADMIN — ZOLPIDEM TARTRATE 5 MG: 5 TABLET ORAL at 01:48

## 2018-07-31 RX ADMIN — ACETAMINOPHEN 1000 MG: 10 INJECTION, SOLUTION INTRAVENOUS at 18:35

## 2018-07-31 RX ADMIN — BUPIVACAINE HYDROCHLORIDE AND EPINEPHRINE 6 ML: 2.5; 5 INJECTION, SOLUTION EPIDURAL; INFILTRATION; INTRACAUDAL; PERINEURAL at 07:12

## 2018-07-31 RX ADMIN — PHENYLEPHRINE HYDROCHLORIDE 80 MCG: 10 INJECTION INTRAVENOUS at 18:29

## 2018-07-31 RX ADMIN — Medication: at 18:27

## 2018-07-31 RX ADMIN — Medication 2 MILLI-UNITS/MIN: at 00:13

## 2018-07-31 RX ADMIN — MORPHINE SULFATE 2 MG: 0.5 INJECTION, SOLUTION EPIDURAL; INTRATHECAL; INTRAVENOUS at 18:18

## 2018-07-31 RX ADMIN — Medication 12 ML/HR: at 07:26

## 2018-07-31 RX ADMIN — MORPHINE SULFATE 1 MG: 0.5 INJECTION, SOLUTION EPIDURAL; INTRATHECAL; INTRAVENOUS at 18:22

## 2018-07-31 NOTE — OP NOTES
Operative Note    Name: [facility] Record Number: [id number]   YOB: 1985  Today's Date: 2018      Pre-operative Diagnosis: Arrest of descent, declines trial of operative delivery    Post-operative Diagnosis: Same, fetal head in OT position. Operation: low transverse  section Procedure(s):   SECTION    Surgeon(s):  MD Dina Resendiz MD    Anesthesia: Spinal    Prophylactic Antibiotics: Ancef  DVT Prophylaxis: Sequential Compression Devices         Fetal Description: varela     Birth Information:   Information for the patient's :  Cesilia Ha [[id number]]   Delivery of a 3.485 kg Male [2] infant on 2018 at 6:26 PM. Apgars were 9 and 9. Umbilical Cord:     Umbilical Cord Events:     Placenta:  removal with  appearance. Amniotic Fluid Volume: Moderate     Amniotic Fluid Description:             Placenta:  spontaneous     Estimated Blood Loss (ml):  1000 mL    Specimens: None           Complications:  none    Procedure Detail:      After proper patient identification and consent, the patient was taken to the operating room, where epidural anesthesia was administered and found to be adequate. Ventura catheter was placed using sterile technique. The patient was prepped and draped in the normal sterile fashion. The abdomen was entered using the Pfannenstiel technique. The peritoneum was entered sharply well superior to the bladder without any apparent injury. The bladder flap was not created. A low transverse uterine incision was made with the scalpel  and extended with blunt finger dissection. Amniotomy was performed and the fluid was small amount clear. The babys head was then delivered atraumatically. The cord was clamped and cut and the baby was handed off to Nursing staff in attendance. Placenta was then removed from the uterus. The uterus was curettaged with a moist lap pad and cleared of all clots and debris. The uterus was moderately atonic. Double pitocin was administered, followed by methergine 0.2 mg IM. Uterine tone improved. The uterine incision was closed with 0 monocryl, double layer  in running locking fashion with good hemostasis assured. The fascia was closed with 0 Vicryl in a running fashion. The subcutaneous space was closed with 3-0 Vicryl in interrupted sutures. The skin was closed with a 4-0 Monocryl subcuticular closure. The patient tolerated the procedure well. Sponge, lap, and needle counts were correct times three and the patient and baby were taken to recovery/postpartum room in stable condition.     Mariposa Wilkinson MD  July 31, 2018  7:12 PM

## 2018-07-31 NOTE — PROGRESS NOTES
Patient now complete 4 hours, pushing >3 hours. Minimal descent noted throughout pushing. Discussed options of operative delivery vs. Primary c/s. After review of r/b/a of each, she declines attempt at operative delivery and prefers to proceed with c/s.

## 2018-07-31 NOTE — ANESTHESIA PROCEDURE NOTES
CSE Block Performed by: Génesis Pradhan Authorized by: Génesis Pradhan Pre-Procedure 
preanesthetic checklist: risks and benefits discussed, site marked and timeout performed Procedure:  
Patient Position:  Seated Prep Region:  Lumbar Prep: DuraPrep Location:  L2-3 Epidural Needle:  
Needle Type:  Tuohy Needle Gauge:  17 G Injection Technique:  Loss of resistance using air Attempts:  1 Spinal Needle:  
Needle Type: Mackenzie Needle Gauge:  25 G Catheter:  
Catheter Type:  Flex-tip Catheter Size:  19 G Events: no blood with aspiration, no paresthesia, negative aspiration test and CSF confirmed Test Dose:  Bupivacaine 0.25% w/ epi and negative Assessment:  
Catheter Secured:  Tegaderm and tape Insertion:  Uncomplicated Patient tolerance:  Patient tolerated the procedure well with no immediate complications Hands washed and mask worn during procedure. Spinal portion: A 25 g pencil point spinal needle was placed through the Touhy x1 attempt until CSF was obtained. 0.4 mL 0.25% bupivacaine with 1:200K epinephrine was deposited into the CSF. -paresthesia.

## 2018-07-31 NOTE — PROGRESS NOTES
0700 - bedside report received from [employer], rn. Patient is getting prepped for epidural. 
3755- dr. Flavio Hilario at bedside. 9839- epidural completed, patient laying back in bed. 
0815 - dr. Jonnathan Christianson at bedside. 5013 - sve by dr. Ada Galeano. - 1. Labor strip reviewed by dr. Jonnathan Christianson. 1055 - updates given to dr Deborah Galeano. 1148 - dr. Jonnathan Christianson at bedside, no changes at this time. 1301- sve by dr Jonnathan Christianson - patient is complete and ready to push. 1315- begin pushing. 1500 - patient is resting on hands and knees, taking a break from pushing. Call bell in reach, s/o at bedside. 1600 - resume pushing. 6452 - patient and dr. Jonnathan Christianson discussing options of  or vacuum extraction. 1738 - patient and s/o decided for c/s - dr. Jonnathan Christianson aware. 200- dr Latisha Wilcox called for . 1750 - dr. Grabiel Collins at bedside 1807- leave room for OR 
1900- fundal check done - bloody urine draining from tran - dr. Jonnathan Christianson aware and told this RN to continue monitoring for now.   
26 - patient to MIU. 
 - report given to 1615 Allie Daley, rn.

## 2018-07-31 NOTE — L&D DELIVERY NOTE
Delivery Summary  Patient: Rimma Caicedo             Circumcision:   desires  Additional Delivery Comments - PLTCS for arrest of descent. See op note. EBL 1000 mL. Given double pitocin and methergine IM. Information for the patient's :  Jael  [[id number]]       Labor Events:    Labor: No   Rupture Date:     Rupture Time:     Rupture Type     Amniotic Fluid Volume: Moderate    Amniotic Fluid Description:       Induction: None       Augmentation: Oxytocin   Labor Events: Failure to Progress in Second Stage     Cervical Ripening:     None     Delivery Events:  Episiotomy: None   Laceration(s): None     Repaired: None    Number of Repair Packets:     Suture Type and Size: None     Estimated Blood Loss (ml):  ml       Delivery Date: 2018    Delivery Time: 6:26 PM  Delivery Type: , Low Transverse  Sex:  Male     Gestational Age: 43w3d   Delivery Clinician:  Em Mccollum  Living Status: Living   Delivery Location: L&D or 12          APGARS  One minute Five minutes Ten minutes   Skin color: 1   1        Heart rate: 2   2        Grimace: 2   2        Muscle tone: 2   2        Breathin   2        Totals: 9   9            Presentation: Vertex    Position:        Resuscitation Method:  Suctioning-bulb; Tactile Stimulation     Meconium Stained: None      Cord Vessels: 3 Vessels      Cord Events:    Cord Blood Sent?:  No    Blood Gases Sent?:  No    Placenta:  Date/Time:   6:27 PM  Removal: Expressed      Appearance: Intact     [place] Measurements:  Birth Weight: 3.485 kg      Birth Length: 53.3 cm      Head Circumference: 36.8 cm      Chest Circumference: 13.5 cm     Abdominal Girth: 30.5 cm    Other Providers:   JAIME BRICEÑO;RADHA SMALL;SHAE CENTENO;;;ESSENCE CLARK;;;;SHELL QUINTANA;JOI CARL;Aleida ROMO, Obstetrician;Primary Nurse;Primary  Nurse;Nicu Nurse;Neonatologist;Anesthesiologist;Crna;Nurse Practitioner;Midwife;Nursery Nurse;Staff Nurse;Scrub Tech           Cord pH:  none    Episiotomy: None   Laceration(s): None     Estimated Blood Loss (ml): 1000 mL    Labor Events  Method: None      Augmentation: Oxytocin   Cervical Ripening:       None        Operative Vaginal Delivery - none    Group B Strep:   Lab Results   Component Value Date/Time    GrBStrep, External neg 07/10/2018     Information for the patient's :  Julee Choe [[id number]]   No results found for: ABORH, PCTABR, PCTDIG, BILI, ABORHEXT, ABORH    No results found for: APH, APCO2, APO2, AHCO3, ABEC, ABDC, O2ST, EPHV, PCO2V, PO2V, HCO3V, EBEV, EBDV, SITE, RSCOM

## 2018-07-31 NOTE — PROGRESS NOTES
Called to see patient for variable decelerations. Patient comfortable with epidural 
 
SVE 9/90/-1 Cat 2 tracing, but good variability and +scalp stim Pitocin at 10 Rapid progression through active phase. Will do maternal repositioning, fluid bolus prn. Anticipate vaginal delivery.

## 2018-07-31 NOTE — PROGRESS NOTES
Patient complete at 1315. Pushing with good effort for 2 hours. C/C/+2. Will rest in hands and knees, then continue pushing.

## 2018-07-31 NOTE — PROGRESS NOTES
Patient was checked by RN at [phone], 9/C/-1. Increased pitocin per protocol. Re-evalauted patient at 1145--several decelerations noted when placed in Trendelenburg position, resolved immediately after returning to left lateral. Not feeling pressure yet. Continue to monitor and plan to recheck in 1 hour. If not complete at that time, will place IUPC.

## 2018-07-31 NOTE — ANESTHESIA PREPROCEDURE EVALUATION
Anesthetic History No history of anesthetic complications Review of Systems / Medical History Patient summary reviewed, nursing notes reviewed and pertinent labs reviewed Pulmonary Within defined limits Neuro/Psych Within defined limits Cardiovascular Within defined limits Exercise tolerance: >4 METS 
  
GI/Hepatic/Renal 
Within defined limits Endo/Other Within defined limits Other Findings Comments: IUP Physical Exam 
 
Airway Mallampati: II 
 
Neck ROM: normal range of motion Mouth opening: Normal 
 
 Cardiovascular Regular rate and rhythm,  S1 and S2 normal,  no murmur, click, rub, or gallop Dental 
 
 
  
Pulmonary Breath sounds clear to auscultation Abdominal 
GI exam deferred Other Findings Anesthetic Plan ASA: 2 Anesthesia type: CSE Anesthetic plan and risks discussed with: Patient

## 2018-07-31 NOTE — ANESTHESIA POSTPROCEDURE EVALUATION
Post-Anesthesia Evaluation and Assessment Patient: Benita Turcios MRN: [id number]  SSN: [id number] YOB: 1985  Age: 34 y.o. Sex: female Cardiovascular Function/Vital Signs Visit Vitals  /79  Pulse 86  Temp 37.3 °C (99.1 °F)  Resp 20  
 Ht 5' 2\" (1.575 m)  Wt 66.7 kg (147 lb)  SpO2 99%  Breastfeeding Yes  BMI 26.89 kg/m2 Patient is status post CSE anesthesia for Procedure(s):  SECTION. Nausea/Vomiting: None Postoperative hydration reviewed and adequate. Pain: 
Pain Scale 1: Numeric (0 - 10) (18) Pain Intensity 1: 0 (18) Managed Neurological Status: At baseline Mental Status and Level of Consciousness: Arousable Pulmonary Status:  
O2 Device: Nasal cannula (18) Adequate oxygenation and airway patent Complications related to anesthesia: None Post-anesthesia assessment completed. No concerns Signed By: Juan Jose Moss MD   
 2018

## 2018-08-01 LAB
ERYTHROCYTE [DISTWIDTH] IN BLOOD BY AUTOMATED COUNT: 14.3 % (ref 11.5–14.5)
HCT VFR BLD AUTO: 27.6 % (ref 35–47)
HGB BLD-MCNC: 8.7 G/DL (ref 11.5–16)
MCH RBC QN AUTO: 24.6 PG (ref 26–34)
MCHC RBC AUTO-ENTMCNC: 31.5 G/DL (ref 30–36.5)
MCV RBC AUTO: 78 FL (ref 80–99)
NRBC # BLD: 0 K/UL (ref 0–0.01)
NRBC BLD-RTO: 0 PER 100 WBC
PLATELET # BLD AUTO: 253 K/UL (ref 150–400)
PMV BLD AUTO: 11.1 FL (ref 8.9–12.9)
RBC # BLD AUTO: 3.54 M/UL (ref 3.8–5.2)
WBC # BLD AUTO: 18.7 K/UL (ref 3.6–11)

## 2018-08-01 PROCEDURE — 36415 COLL VENOUS BLD VENIPUNCTURE: CPT | Performed by: OBSTETRICS & GYNECOLOGY

## 2018-08-01 PROCEDURE — 74011250636 HC RX REV CODE- 250/636: Performed by: OBSTETRICS & GYNECOLOGY

## 2018-08-01 PROCEDURE — 65410000002 HC RM PRIVATE OB

## 2018-08-01 PROCEDURE — 74011250637 HC RX REV CODE- 250/637: Performed by: OBSTETRICS & GYNECOLOGY

## 2018-08-01 PROCEDURE — 85027 COMPLETE CBC AUTOMATED: CPT | Performed by: OBSTETRICS & GYNECOLOGY

## 2018-08-01 RX ADMIN — SODIUM CHLORIDE, SODIUM LACTATE, POTASSIUM CHLORIDE, AND CALCIUM CHLORIDE 125 ML/HR: 600; 310; 30; 20 INJECTION, SOLUTION INTRAVENOUS at 00:27

## 2018-08-01 RX ADMIN — IBUPROFEN 600 MG: 600 TABLET ORAL at 14:20

## 2018-08-01 RX ADMIN — IBUPROFEN 600 MG: 600 TABLET ORAL at 05:58

## 2018-08-01 RX ADMIN — IBUPROFEN 600 MG: 600 TABLET ORAL at 22:31

## 2018-08-01 RX ADMIN — DOCUSATE SODIUM 100 MG: 100 CAPSULE, LIQUID FILLED ORAL at 08:31

## 2018-08-01 NOTE — PROGRESS NOTES
Duramorph Follow-Up Note 1 Day Post-Op sp Procedure(s):  SECTION. /79 (BP 1 Location: Right arm, BP Patient Position: Sitting)  Pulse 99  Temp 37.3 °C (99.2 °F)  Resp 18  Ht 5' 2\" (1.575 m)  Wt 66.7 kg (147 lb)  SpO2 98%  Breastfeeding? Yes  BMI 26.89 kg/m2. Patient is POD-1 S/P intrathecal duramorph. Pain is well controlled Patient reports no headache, fever, weakness or numbness. Epidural/spinal tap site is clean, dry and intact. No obvious Anesthesia complications noted. Plan: 
 
Pain management as per primary service.

## 2018-08-01 NOTE — LACTATION NOTE
This note was copied from a baby's chart. Infant had two sleepy attempts to feed after birth followed by 5 breastfeedings with some being 2 to 5 minutes. LATCH scores are 7-5 and 7. Weight loss is -1.2% with 2 voids and 4 stools since birth. Observed at [facility] with latch score of 9. Mom states she has her pump via insurance. Mom's breasts are firm and niopples short. Encouraged deep latch to prevent detaching.

## 2018-08-01 NOTE — LACTATION NOTE
This note was copied from a baby's chart. Mother  for 10 minutes after return to room and was having visitors. Encouraged responding to feeding cues and well as waking to feed every 2 to 3 hours if sleepy. Encourage mother to ask for assistance as needed with feedings post . Breastfeeding log initiated and explained use. Mom given lanolin for prevention of nipple tenderness.

## 2018-08-01 NOTE — PROGRESS NOTES
Post-Operative  Day 1 Joselin Found Assessment: Post-Op day 1, stable Plan:   1. Routine post-operative care 2. The risks and benefits of the circumcision  procedure and anesthesia including: bleeding, infection, variability of cosmetic results were discussed at length with the mother. She is aware that future repeat procedures may be necessary. She gives informed consent to proceed as noted and her questions are answered. 3. Acute on chronic anemia, hgb 10.2 to 8.7- no evidence of active bleeding, asymptomatic, recheck cbc in am, home on iron Information for the patient's :  Moy Aden [[id number]] , Low Transverse Patient doing well without significant complaint. Nausea and vomiting resolved, tolerating liquids, no flatus, tran in place. She denies feeling lightheaded or dizzy. No CP/SOB Vitals: 
Visit Vitals  /79 (BP 1 Location: Right arm, BP Patient Position: Sitting)  Pulse 99  Temp 99.2 °F (37.3 °C)  Resp 18  Ht 5' 2\" (1.575 m)  Wt 66.7 kg (147 lb)  SpO2 98%  Breastfeeding Yes  BMI 26.89 kg/m2 Temp (24hrs), Av.9 °F (37.2 °C), Min:98.1 °F (36.7 °C), Max:99.3 °F (37.4 °C) Last 24hr Input/Output: 
 
Intake/Output Summary (Last 24 hours) at 18 3156 Last data filed at 18 6618 Gross per 24 hour Intake          4134.09 ml Output             2850 ml Net          1284.09 ml Exam:   
    Patient without distress. Lungs clear. Abdomen, bowel sounds present, soft, expected tenderness, fundus firm Wound dressing intact Perineum normal lochia noted Lower extremities are negative for swelling, cords or tenderness. Labs:  
Lab Results Component Value Date/Time  WBC 18.7 (H) 2018 03:32 AM  
 WBC 10.5 2018 08:14 PM  
 HGB 8.7 (L) 2018 03:32 AM  
 HGB 10.2 (L) 2018 08:14 PM  
 HCT 27.6 (L) 2018 03:32 AM  
 HCT 32.4 (L) 2018 08:14 PM  
 PLATELET 438 63/80/1416 03:32 AM  
 PLATELET 022 47/39/0735 08:14 PM  
 
 
Recent Results (from the past 24 hour(s)) CBC W/O DIFF Collection Time: 08/01/18  3:32 AM  
Result Value Ref Range WBC 18.7 (H) 3.6 - 11.0 K/uL  
 RBC 3.54 (L) 3.80 - 5.20 M/uL HGB 8.7 (L) 11.5 - 16.0 g/dL HCT 27.6 (L) 35.0 - 47.0 % MCV 78.0 (L) 80.0 - 99.0 FL  
 MCH 24.6 (L) 26.0 - 34.0 PG  
 MCHC 31.5 30.0 - 36.5 g/dL  
 RDW 14.3 11.5 - 14.5 % PLATELET 551 120 - 596 K/uL MPV 11.1 8.9 - 12.9 FL  
 NRBC 0.0 0  WBC ABSOLUTE NRBC 0.00 0.00 - 0.01 K/uL

## 2018-08-01 NOTE — PROGRESS NOTES
TRANSFER - OUT REPORT: 
 
Verbal report given to Konstantin Epstein RN(name) on Neil  being transferred to MIU(unit) for routine progression of care Report consisted of patients Situation, Background, Assessment and  
Recommendations(SBAR). Information from the following report(s) SBAR was reviewed with the receiving nurse. Lines:  
Peripheral IV 07/30/18 Left Wrist (Active) Site Assessment Clean, dry, & intact 7/30/2018  8:18 PM  
Phlebitis Assessment 0 7/30/2018  8:18 PM  
Infiltration Assessment 0 7/30/2018  8:18 PM  
Dressing Status Clean, dry, & intact 7/30/2018  8:18 PM  
Dressing Type Tape;Transparent 7/30/2018  8:18 PM  
Hub Color/Line Status Pink 7/30/2018  8:18 PM  
Action Taken Blood drawn 7/30/2018  8:18 PM  
Alcohol Cap Used No 7/30/2018  8:18 PM  
  
 
Opportunity for questions and clarification was provided.

## 2018-08-01 NOTE — ROUTINE PROCESS
Bedside and Verbal shift change report given to HENRIQUE Grenefield RN (oncoming nurse) by Ganesh Mauriec. Dima Mackey RN (offgoing nurse). Report included the following information SBAR.

## 2018-08-01 NOTE — ROUTINE PROCESS
Bedside and Verbal shift change report given to JUDY Wu RN (oncoming nurse) by Hira Andersen. Jordan Collins RN (offgoing nurse). Report included the following information SBAR.

## 2018-08-01 NOTE — PROGRESS NOTES
Bedside shift change report given to ASHLEY Mccartney (oncoming nurse) by JUDY Bardales RN (offgoing nurse). Report included the following information SBAR.

## 2018-08-02 LAB
ERYTHROCYTE [DISTWIDTH] IN BLOOD BY AUTOMATED COUNT: 14.6 % (ref 11.5–14.5)
HCT VFR BLD AUTO: 27.2 % (ref 35–47)
HGB BLD-MCNC: 8.4 G/DL (ref 11.5–16)
MCH RBC QN AUTO: 24.3 PG (ref 26–34)
MCHC RBC AUTO-ENTMCNC: 30.9 G/DL (ref 30–36.5)
MCV RBC AUTO: 78.8 FL (ref 80–99)
NRBC # BLD: 0 K/UL (ref 0–0.01)
NRBC BLD-RTO: 0 PER 100 WBC
PLATELET # BLD AUTO: 305 K/UL (ref 150–400)
PMV BLD AUTO: 10.7 FL (ref 8.9–12.9)
RBC # BLD AUTO: 3.45 M/UL (ref 3.8–5.2)
WBC # BLD AUTO: 18.9 K/UL (ref 3.6–11)

## 2018-08-02 PROCEDURE — 65410000002 HC RM PRIVATE OB

## 2018-08-02 PROCEDURE — 36415 COLL VENOUS BLD VENIPUNCTURE: CPT | Performed by: OBSTETRICS & GYNECOLOGY

## 2018-08-02 PROCEDURE — 74011250637 HC RX REV CODE- 250/637: Performed by: OBSTETRICS & GYNECOLOGY

## 2018-08-02 PROCEDURE — 85027 COMPLETE CBC AUTOMATED: CPT | Performed by: OBSTETRICS & GYNECOLOGY

## 2018-08-02 RX ADMIN — IBUPROFEN 600 MG: 600 TABLET ORAL at 07:17

## 2018-08-02 RX ADMIN — IBUPROFEN 600 MG: 600 TABLET ORAL at 20:50

## 2018-08-02 RX ADMIN — DOCUSATE SODIUM 100 MG: 100 CAPSULE, LIQUID FILLED ORAL at 09:45

## 2018-08-02 RX ADMIN — IBUPROFEN 600 MG: 600 TABLET ORAL at 15:32

## 2018-08-02 RX ADMIN — DOCUSATE SODIUM 100 MG: 100 CAPSULE, LIQUID FILLED ORAL at 18:10

## 2018-08-02 NOTE — PROGRESS NOTES
Bedside shift change report given to ALY Marino RN (oncoming nurse) by [employer] (offgoing nurse). Report included the following information SBAR, Intake/Output, MAR and Recent Results.

## 2018-08-02 NOTE — PROGRESS NOTES
Post-Operative  Day 2 Yumiko Sauceda Assessment: Post-Op day 2, doing well Plan: 1. Routine post-operative care 2. OP circ due to twisted [place] 3. Acute on chronic anemia. hgb 10.2-->8.7--8.4. Asx. Iron on dc. Information for the patient's :  Rohan Medico [[id number]] , Low Transverse Patient doing well without significant complaint. Nausea and vomiting resolved, tolerating liquids, passing flatus, voiding and ambulating without difficulty. Vitals: 
Visit Vitals  /76 (BP 1 Location: Right arm, BP Patient Position: At rest)  Pulse 89  Temp 98.4 °F (36.9 °C)  Resp 17  Ht 5' 2\" (1.575 m)  Wt 66.7 kg (147 lb)  SpO2 98%  Breastfeeding Yes  BMI 26.89 kg/m2 Temp (24hrs), Av.6 °F (37 °C), Min:98 °F (36.7 °C), Max:99.1 °F (37.3 °C) Exam:   
    Patient without distress. Abdomen, bowel sounds present, soft, expected tenderness, fundus firm Wound incision clean, dry and intact Lower extremities are negative for swelling, cords or tenderness. Labs:  
Lab Results Component Value Date/Time WBC 18.9 (H) 2018 04:50 AM  
 WBC 18.7 (H) 2018 03:32 AM  
 WBC 10.5 2018 08:14 PM  
 HGB 8.4 (L) 2018 04:50 AM  
 HGB 8.7 (L) 2018 03:32 AM  
 HGB 10.2 (L) 2018 08:14 PM  
 HCT 27.2 (L) 2018 04:50 AM  
 HCT 27.6 (L) 2018 03:32 AM  
 HCT 32.4 (L) 2018 08:14 PM  
 PLATELET 886  04:50 AM  
 PLATELET 371  03:32 AM  
 PLATELET 054  08:14 PM  
 
 
Recent Results (from the past 24 hour(s)) CBC W/O DIFF Collection Time: 18  4:50 AM  
Result Value Ref Range WBC 18.9 (H) 3.6 - 11.0 K/uL  
 RBC 3.45 (L) 3.80 - 5.20 M/uL HGB 8.4 (L) 11.5 - 16.0 g/dL HCT 27.2 (L) 35.0 - 47.0 % MCV 78.8 (L) 80.0 - 99.0 FL  
 MCH 24.3 (L) 26.0 - 34.0 PG  
 MCHC 30.9 30.0 - 36.5 g/dL  
 RDW 14.6 (H) 11.5 - 14.5 % PLATELET 866 431 - 613 K/uL MPV 10.7 8.9 - 12.9 FL  
 NRBC 0.0 0  WBC ABSOLUTE NRBC 0.00 0.00 - 0.01 K/uL

## 2018-08-02 NOTE — LACTATION NOTE
This note was copied from a baby's chart. 1030 consult with assistance in waking infant. Easily awakened with diaper check/changed wet. Assisted mother to pump to tony short nipple. Asymetrical alignment and latch followed with audible swallowing/milk transfer. Recommend pumping ac/pc and if sleepy ebm.  variances reviewed/waking more each day. Has her insurance provided pump for prn uses. Anticipates discharge tomorrow. Am weight assessed at -7 %. 9 feeds 2 wet 5 stools.   
Continue observation for milk transfer and ebm feedings prn

## 2018-08-03 VITALS
TEMPERATURE: 98 F | WEIGHT: 147 LBS | HEIGHT: 62 IN | DIASTOLIC BLOOD PRESSURE: 79 MMHG | OXYGEN SATURATION: 98 % | SYSTOLIC BLOOD PRESSURE: 110 MMHG | HEART RATE: 81 BPM | RESPIRATION RATE: 17 BRPM | BODY MASS INDEX: 27.05 KG/M2

## 2018-08-03 PROCEDURE — 74011250637 HC RX REV CODE- 250/637: Performed by: OBSTETRICS & GYNECOLOGY

## 2018-08-03 RX ORDER — IBUPROFEN 800 MG/1
800 TABLET ORAL
Qty: 30 TAB | Refills: 1 | Status: SHIPPED | OUTPATIENT
Start: 2018-08-03

## 2018-08-03 RX ORDER — OXYCODONE AND ACETAMINOPHEN 5; 325 MG/1; MG/1
1-2 TABLET ORAL
Qty: 15 TAB | Refills: 0 | Status: SHIPPED | OUTPATIENT
Start: 2018-08-03

## 2018-08-03 RX ADMIN — IBUPROFEN 600 MG: 600 TABLET ORAL at 12:24

## 2018-08-03 RX ADMIN — DOCUSATE SODIUM 100 MG: 100 CAPSULE, LIQUID FILLED ORAL at 07:55

## 2018-08-03 RX ADMIN — IBUPROFEN 600 MG: 600 TABLET ORAL at 04:23

## 2018-08-03 NOTE — ROUTINE PROCESS
Bedside and Verbal shift change report given to Eddie Rust RN (oncoming nurse) by Alphonse Dooley RN (offgoing nurse). Report included the following information SBAR.

## 2018-08-03 NOTE — PROGRESS NOTES
Post-Operative  Day 3 Kristal Leigh Assessment: Post-Op day 3, doing well Plan: 1. Discharge home today 2. Follow up in office in 6 weeks with Miguel A Jimenez MD 
3. Post partum activity/wound care advised, diet as tolerated 4. Discharge Medications: ibuprofen, percocet and medications prior to admission Information for the patient's :  Ana Rosa [[id number]] , Low Transverse Patient doing well without significant complaint. Tolerating diet, passing flatus, voiding and ambulating without difficulty Vitals: 
Visit Vitals  /79 (BP 1 Location: Right arm, BP Patient Position: At rest)  Pulse 81  Temp 98 °F (36.7 °C)  Resp 17  Ht 5' 2\" (1.575 m)  Wt 66.7 kg (147 lb)  SpO2 98%  Breastfeeding Yes  BMI 26.89 kg/m2 Temp (24hrs), Av.2 °F (36.8 °C), Min:97.8 °F (36.6 °C), Max:98.9 °F (37.2 °C) Exam:   
    Patient without distress. Abdomen, bowel sounds present, soft, expected tenderness, fundus firm Wound incision clean, dry and intact Lower extremities are negative for swelling, cords or tenderness. Labs:  
Lab Results Component Value Date/Time WBC 18.9 (H) 2018 04:50 AM  
 WBC 18.7 (H) 2018 03:32 AM  
 WBC 10.5 2018 08:14 PM  
 HGB 8.4 (L) 2018 04:50 AM  
 HGB 8.7 (L) 2018 03:32 AM  
 HGB 10.2 (L) 2018 08:14 PM  
 HCT 27.2 (L) 2018 04:50 AM  
 HCT 27.6 (L) 2018 03:32 AM  
 HCT 32.4 (L) 2018 08:14 PM  
 PLATELET 421  04:50 AM  
 PLATELET 850  03:32 AM  
 PLATELET 499  08:14 PM  
 
 
No results found for this or any previous visit (from the past 24 hour(s)).

## 2018-08-03 NOTE — DISCHARGE SUMMARY
Obstetrical Discharge Summary     Name: Agnieszka Gibson MRN: [id number]  SSN: [id number]    YOB: 1985  Age: 34 y.o. Sex: female      Admit Date: 2018    Discharge Date: 8/3/2018     Admitting Physician: Shima Pearce DO     Attending Physician:  Maxine Pond MD     Admission Diagnoses: Leaking Fluid  Pregnancy  Pregnancy  , 39w4d, 2nd stage arrest    Discharge Diagnoses:   Information for the patient's :  Florance Major [[id number]]   Delivery of a 3.485 kg male infant via , Low Transverse on 2018 at 6:26 PM  by . Apgars were 9 and 9. Additional Diagnoses:   Hospital Problems  Date Reviewed: 2016          Codes Class Noted POA    Pregnancy ICD-10-CM: Z34.90  ICD-9-CM: V22.2  2018 Unknown             Lab Results   Component Value Date/Time    Rubella, External immune 2.16 2018    GrBStrep, External neg 07/10/2018       Hospital Course: Normal hospital course following the delivery. Disposition at Discharge: Home or self care    Discharged Condition: Stable    Patient Instructions:   Current Discharge Medication List      START taking these medications    Details   ibuprofen (MOTRIN) 800 mg tablet Take 1 Tab by mouth every eight (8) hours as needed for Pain. Qty: 30 Tab, Refills: 1      oxyCODONE-acetaminophen (PERCOCET) 5-325 mg per tablet Take 1-2 Tabs by mouth every six (6) hours as needed for Pain. Max Daily Amount: 8 Tabs. Qty: 15 Tab, Refills: 0    Associated Diagnoses: Pregnancy, unspecified gestational age         [de-identified] these medications which have NOT CHANGED    Details   PNV38-Iron Cbn&Gluc-FA-DSS-DHA 35-1- mg cmpk Take 1 Tab by mouth daily. Indications: pregnancy      docusate sodium (COLACE) 100 mg capsule Take 100 mg by mouth two (2) times daily as needed for Constipation. Reference my discharge instructions.     Follow-up Appointments   Procedures    FOLLOW UP VISIT Appointment in: 6 Weeks     Standing Status:   Standing     Number of Occurrences:   1     Order Specific Question:   Appointment in     Answer:   6 Weeks        Signed By:  Garrett Herrmann MD     August 3, 2018

## 2018-08-03 NOTE — PROGRESS NOTES
Bedside shift change report given to Farooq Holloway RN (oncoming nurse) by ALY Marino RN (offgoing nurse). Report included the following information SBAR, Procedure Summary, Intake/Output, MAR and Recent Results.

## 2018-08-03 NOTE — DISCHARGE INSTRUCTIONS
Delivery (Postpartum Care): After Your Visit    Your Care Instructions    Your baby was delivered through a cut, called an incision, in your belly. This surgery is called a  delivery, or a . After childbirth (postpartum period), your body goes through many changes. In the next few weeks, your body will slowly heal. It can take 4 weeks or more for the incision from your surgery to heal. It is easy to get too tired and overwhelmed during the first weeks after your baby is born. You may feel emotional during this time. Changes in your hormones can shift your mood without warning. It is easy to get too tired and overwhelmed during the first weeks after childbirth. Take it easy on yourself. You may find it hard to meet the extra demands on your energy and time. Get rest whenever you can, accept help from others, and eat well and drink plenty of fluids. After a , you may have gas or need to burp a lot. You may have some light vaginal bleeding or spotting for up to 6 weeks after surgery. It is normal to have pain in the incision area off and on during the next 6 months. No driving for 1 week after delivery. No heavy lifting. No more than 8 lbs or the size of baby for 2-3 weeks. Limit use of stairs. 1-2 times per day for the first week after delivery. Follow-up care is a key part of your treatment and safety. Be sure to make and go to all appointments, and call your doctor if you are having problems. Its also a good idea to know your test results and keep a list of the medicines you take. Around 4 to 6 weeks after your baby's birth, you will have a follow-up visit with your doctor. This visit is your time to talk to your doctor about anything you are concerned or curious about. Keep a list of questions to bring to your postpartum visit. Your questions might be about:  Changes in your breasts, such as lumps or soreness.   When to expect your menstrual period to start again.  What form of birth control is best for you. Weight you have put on during the pregnancy. Exercise options. What foods and drinks are best for you, especially if you are breast-feeding. Problems you might be having with breast-feeding. When you can have sex. Some women may want to talk about lubricants for the vagina. Any feelings of sadness or restlessness that you are having. How can you care for yourself at home? Be sure that you understand any instructions your doctor has given you after surgery. This will guide your activities and tell you what to watch for in the next few weeks. Vaginal bleeding and cramps  After delivery, you will have a bloody discharge from the vagina. This will turn pink within a week and then white or yellow after about 10 days. It may last for 2 to 4 weeks or longer, until the uterus has healed. You may have cramps for the first few days after childbirth. These are normal and occur as the uterus shrinks to normal size. Take an over-the-counter pain medicine, such as acetaminophen (Tylenol), ibuprofen (Advil, Motrin), or naproxen (Aleve), for cramps. Read and follow all instructions on the label. Do not take aspirin, because it can cause more bleeding. Take other medicines exactly as prescribed. Call your doctor if you have any problems with your medicine. Incision  You may have had staples removed while you were in the hospital. Keep the area clean, and wash it with water and mild soap. If you had stitches, they should dissolve on their own and should not need to be removed. Follow your doctor's instructions for cleaning the stitched area. If you have steri-strips (tape) the will come off on their own in 7-10 days. Breast-feeding and breast fullness  Breast-feed your baby in positions that do not put pressure on your incision, such as side-lying or football-hold positions.  Ask your nurse, doctor, midwife, or lactation specialist to show you these positions if you do not know what they are. Your breasts may overfill (engorge) in the first few days after delivery. To help milk flow and to relieve pain, warm your breasts in the shower or by using warm, moist towels before nursing. Express a small amount of milk to soften your breast near the nipple and then feed your baby. Put an ice or cold pack on your breast for a few minutes after nursing to reduce swelling and pain. Put a thin cloth between the ice pack and your skin. If you are not nursing, do not put warmth on your breasts or touch your breasts. Wear a tight bra or sports bra, and use an ice or cold pack on your breasts to reduce swelling and pain. Breast fullness usually lasts 3 to 4 days. Activity  Eat a balanced diet. Do not try to lose weight by cutting calories. Keep taking your prenatal vitamins, or take a multivitamin. Get as much rest as you can. Try to take naps when your baby sleeps during the day. Get help with household chores from family or friends, if you can. Do not try to do it all yourself. Get some gentle exercise, such as walking, every day. Do not lift more than 10 pounds for the next 4 to 6 weeks. Do not have sex or use tampons until you have stopped bleeding and at least 4 to 6 weeks have passed since you gave birth. Talk to your doctor about birth control. You can get pregnant even before your period returns. Also, you can get pregnant while you are breast-feeding. Mental health  It is normal to have some sadness, anxiety, sleeplessness, and mood swings after you go home. If you feel upset or hopeless for more than a few days, talk to your doctor. If you have any thoughts of hurting yourself or anyone else--including your baby--call 911 or go to the emergency room. Many women get the \"baby blues\" during the first few days after childbirth. The \"baby blues\" usually peak around the fourth day and then ease up in less than 2 weeks.  If you have the \"baby blues\" for more than a few days, or if you have thoughts of hurting yourself or your baby, call your doctor right away. Constipation and hemorrhoids  Drink plenty of fluids (8 to 10 glasses a day), especially water. · Eat plenty of fiber each day to avoid constipation. Include foods such as whole-grain breads and cereals, raw vegetables, raw and dried fruits, and beans. · If you have hemorrhoids or swelling or pain around the opening of your vagina, try using cold and heat. You can put ice or a cold pack on the area for 10 to 20 minutes at a time. Put a thin cloth between the ice and your skin. Also try sitting in a few inches of warm water (sitz bath) 3 times a day and after bowel movements. Drink plenty of fluids, enough so that your urine is light yellow or clear like water. If you have kidney, heart, or liver disease and have to limit fluids, talk with your doctor before you increase the amount of fluids you drink. When should you call for help? Call 911 anytime you think you may need emergency care. For example, call if:  You are thinking of hurting yourself, your baby, or anyone else. You have sudden, severe pain in your belly. You pass out (lose consciousness). Call your doctor now or seek immediate medical care if:  You have severe vaginal bleeding. You are passing blood clots and soaking through a pad each hour for 2 or more hours. Your vaginal bleeding seems to be getting heavier or is still bright red 4 days after delivery, or you pass blood clots larger than the size of a golf ball. You have increased redness, heat, or drainage in the incision area. You are dizzy or lightheaded, or you feel like you may faint. You are vomiting or cannot keep fluids down. You have a fever. You have new or more belly pain. You pass tissue (not just blood). The incision seems to be pulling open or starts bleeding. Your vaginal discharge smells bad. Your belly feels more tender or full and hard.   You have pain or redness in one or both breasts. You feel sad, anxious, or hopeless for more than a few days. Where can you learn more? Go to [email or web address] [id number]€[email or web address]    Enter C748 in the search box to learn more about \" Delivery: After Your Visit\". or     Go to [email or web address] [id number]€[id number]¨â€[email or web address]    Enter T271  in the search box to learn more about \"Postpartum Care: After Your Visit\". This care instruction is for use with your licensed healthcare professional. If you have questions about a medical condition or this instruction, always ask your healthcare professional. Care instructions adapted by [employer]ian[employer] Bella (which disclaims liability or warranty for this information) from Maribel Heck, [address] [place] [address] . [facility] disclaims any warranty or liability for your use of this information. Follow up with [facility] in 6 weeks; call to schedule appointment.

## (undated) DEVICE — REM POLYHESIVE ADULT PATIENT RETURN ELECTRODE: Brand: VALLEYLAB

## (undated) DEVICE — 3000CC GUARDIAN II: Brand: GUARDIAN

## (undated) DEVICE — MEDI-VAC NON-CONDUCTIVE SUCTION TUBING: Brand: CARDINAL HEALTH

## (undated) DEVICE — SOLIDIFIER MEDC 1200ML -- CONVERT TO 356117

## (undated) DEVICE — SUTURE MCRYL SZ 0 L36IN ABSRB VLT L48MM CTX 1/2 CIR Y398H

## (undated) DEVICE — SOLUTION IV 1000ML 0.9% SOD CHL

## (undated) DEVICE — POOLE SUCTION INSTRUMENT WITH REMOVABLE SHEATH: Brand: POOLE

## (undated) DEVICE — (D)PREP SKN CHLRAPRP APPL 26ML -- CONVERT TO ITEM 371833

## (undated) DEVICE — DEVON™ KNEE AND BODY STRAP 60" X 3" (1.5 M X 7.6 CM): Brand: DEVON

## (undated) DEVICE — SUTURE VCRL SZ 3-0 L36IN ABSRB VLT CT L40MM 1/2 CIR J356H

## (undated) DEVICE — TIP CLEANER: Brand: VALLEYLAB

## (undated) DEVICE — ADHESIVE TISS DERMA FLEX 0.7ML -- HIGH VISCOSITY

## (undated) DEVICE — PACK PROCEDURE SURG C SECT KT SMH

## (undated) DEVICE — STERILE POLYISOPRENE POWDER-FREE SURGICAL GLOVES: Brand: PROTEXIS

## (undated) DEVICE — SUTURE MCRYL SZ 3-0 L27IN ABSRB UD L19MM PS-2 3/8 CIR PRIM Y427H

## (undated) DEVICE — PENCIL ES L3M BTTN SWCH S STL HEX LOK BLDE ELECTRD HOLSTER

## (undated) DEVICE — SUTURE VCRL SZ 0 L36IN ABSRB VLT L40MM CT 1/2 CIR J358H